# Patient Record
Sex: FEMALE | Race: WHITE
[De-identification: names, ages, dates, MRNs, and addresses within clinical notes are randomized per-mention and may not be internally consistent; named-entity substitution may affect disease eponyms.]

---

## 2019-10-28 ENCOUNTER — RECORD ABSTRACTING (OUTPATIENT)
Age: 74
End: 2019-10-28

## 2019-10-28 VITALS — HEIGHT: 63 IN | WEIGHT: 136 LBS | BODY MASS INDEX: 24.1 KG/M2

## 2019-10-28 DIAGNOSIS — Z86.39 PERSONAL HISTORY OF OTHER ENDOCRINE, NUTRITIONAL AND METABOLIC DISEASE: ICD-10-CM

## 2019-10-28 DIAGNOSIS — Z86.59 PERSONAL HISTORY OF OTHER MENTAL AND BEHAVIORAL DISORDERS: ICD-10-CM

## 2019-10-28 PROBLEM — Z00.00 ENCOUNTER FOR PREVENTIVE HEALTH EXAMINATION: Status: ACTIVE | Noted: 2019-10-28

## 2019-10-28 RX ORDER — ROSUVASTATIN CALCIUM 5 MG/1
TABLET, FILM COATED ORAL
Refills: 0 | Status: ACTIVE | COMMUNITY

## 2019-10-28 RX ORDER — ESCITALOPRAM OXALATE 5 MG/1
TABLET, FILM COATED ORAL
Refills: 0 | Status: ACTIVE | COMMUNITY

## 2019-10-29 ENCOUNTER — APPOINTMENT (OUTPATIENT)
Dept: SURGERY | Facility: CLINIC | Age: 74
End: 2019-10-29
Payer: MEDICARE

## 2019-10-29 VITALS — DIASTOLIC BLOOD PRESSURE: 75 MMHG | SYSTOLIC BLOOD PRESSURE: 122 MMHG

## 2019-10-29 PROCEDURE — 99202 OFFICE O/P NEW SF 15 MIN: CPT | Mod: 25

## 2019-10-29 PROCEDURE — 11602 EXC TR-EXT MAL+MARG 1.1-2 CM: CPT

## 2019-10-29 NOTE — PLAN
[FreeTextEntry1] : To keep dry and covered for one day. Can use bacitracin/Neosporin or triple antibiotic cream daily Written instructions provided.\par F/u 7-10 days. Path report pending.\par

## 2019-10-29 NOTE — PHYSICAL EXAM
[Normal Breath Sounds] : Normal breath sounds [Normal Heart Sounds] : normal heart sounds [Alert] : alert [Oriented to Person] : oriented to person [Oriented to Place] : oriented to place [Oriented to Time] : oriented to time [Calm] : calm [de-identified] : NAD [de-identified] : left lateral upper arm skin bioipsy site c/w recent squamous cell cancer bx

## 2019-10-29 NOTE — ASSESSMENT
[FreeTextEntry1] : Procedure: The left upper arm was prepped with betadine. 4.5 5cc of lidocaine with epi mixed with 0.5 bicarbonate was used to anesthetize the region. A # 15 blade was used to create an elliptical excision around the lesion. It was removed and sent to pathology for review. The wound was then closed with interrupted mattress 4-0 nylon sutures. A sterile dressing was applied. The patient tolerated the procedure well.\par \par The specimen measured 1.5cm by 1.8 cm and was sent to pathology for review.\par

## 2019-10-29 NOTE — HISTORY OF PRESENT ILLNESS
[de-identified] : The patient is referred by Dr. Macias and presents for excision of a biopsy proven basal cell cancer of the left shoulder.\par

## 2019-11-01 ENCOUNTER — MESSAGE (OUTPATIENT)
Age: 74
End: 2019-11-01

## 2019-11-05 ENCOUNTER — APPOINTMENT (OUTPATIENT)
Dept: SURGERY | Facility: CLINIC | Age: 74
End: 2019-11-05
Payer: MEDICARE

## 2019-11-05 DIAGNOSIS — C44.92 SQUAMOUS CELL CARCINOMA OF SKIN, UNSPECIFIED: ICD-10-CM

## 2019-11-05 PROCEDURE — 99024 POSTOP FOLLOW-UP VISIT: CPT

## 2019-11-05 NOTE — ASSESSMENT
[FreeTextEntry1] : Wound clean and dry.Well healed. 1/2 sutures removed. Steri-stripped.  \par all margins clear\par f/u next week for remaining suture removal.\par

## 2019-11-12 ENCOUNTER — APPOINTMENT (OUTPATIENT)
Dept: SURGERY | Facility: CLINIC | Age: 74
End: 2019-11-12
Payer: MEDICARE

## 2019-11-12 DIAGNOSIS — Z48.02 ENCOUNTER FOR REMOVAL OF SUTURES: ICD-10-CM

## 2019-11-12 PROCEDURE — 99212 OFFICE O/P EST SF 10 MIN: CPT | Mod: NC

## 2019-11-12 NOTE — ASSESSMENT
[FreeTextEntry1] : Wound clean and dry.Well healed. Sutures removed. Steri-stripped. No problems. Return visit PRN.\par